# Patient Record
Sex: MALE | Race: BLACK OR AFRICAN AMERICAN | URBAN - NONMETROPOLITAN AREA
[De-identification: names, ages, dates, MRNs, and addresses within clinical notes are randomized per-mention and may not be internally consistent; named-entity substitution may affect disease eponyms.]

---

## 2018-07-06 PROBLEM — E11.3493: Noted: 2019-05-06

## 2018-07-06 PROBLEM — H43.12: Noted: 2018-07-06

## 2019-05-06 ENCOUNTER — IMPORTED ENCOUNTER (OUTPATIENT)
Dept: URBAN - NONMETROPOLITAN AREA CLINIC 1 | Facility: CLINIC | Age: 59
End: 2019-05-06

## 2019-05-06 PROCEDURE — S0621 ROUTINE OPHTHALMOLOGICAL EXA: HCPCS

## 2020-07-21 NOTE — PATIENT DISCUSSION
EXAM DESCRIPTION: 



Chest,2 Views



CLINICAL HISTORY: 



46 years Male, PNEUMONIA



COMPARISON: 



18 July 2020



TECHNIQUE: 



PA/lateral



FINDINGS: 



A marked interval improvement in aeration the chest is observed.

The chest is near normal. The heart is within range of normal. No

pleural fluid is seen.



IMPRESSION: 



Marked interval clearing in this patient's previously observed

left-sided pneumonia is observed.



Electronically signed by:  Dax Franz MD  7/21/2020 4:04 PM

CDT Workstation: [id number] VITR HEME OSRESOVLED AND FOLLOWED BY DR. JAMADM W SEVERE PDR-Stressed the importance of keeping blood sugars under control blood pressure under control and weight normalization and regular visits with PCP. -Explained the possible effects of poorly controlled diabetes and the damage that diabetes can cause to ocular health. -Patient to check HgbA1C.-Pt instructed to contact our office with any vision changes. KEEP APPT WITH DR. JAMA; Dr's Notes:  [facility]

## 2021-05-18 NOTE — PATIENT DISCUSSION
Possible micro RT inferiorly along old treatment area, schedule retinal consult with Dr Jim Eden for possible touch up laser OD.

## 2021-05-21 NOTE — PROCEDURE NOTE: CLINICAL
PROCEDURE NOTE: Laser for Retinal Tear OD. Diagnosis: Retinal Detachment, Unspecified. Anesthesia: Topical. Prior to laser, risks/benefits/alternatives to laser discussed including loss of vision, decreased peripheral and night vision, need for more laser and/or surgery and patient wished to proceed. A written consent is on file, and the need for today’s laser was discussed and the patient is understanding and wishes to proceed. Laser Lens: superquad. Number of pulses: 24. Patient tolerated procedure well. There were no complications. Post-op instructions given. Patient given office phone number/answering service number and advised to call immediately should there be loss of vision or pain, or should they have any other questions or concerns. Patrick Duke

## 2022-04-15 ASSESSMENT — VISUAL ACUITY
OS_SC: 20/20
OD_SC: 20/20
OS_CC: J1
OD_CC: J1

## 2022-04-15 ASSESSMENT — TONOMETRY
OS_IOP_MMHG: 16
OD_IOP_MMHG: 16